# Patient Record
Sex: FEMALE | Race: WHITE | NOT HISPANIC OR LATINO | Employment: FULL TIME | URBAN - NONMETROPOLITAN AREA
[De-identification: names, ages, dates, MRNs, and addresses within clinical notes are randomized per-mention and may not be internally consistent; named-entity substitution may affect disease eponyms.]

---

## 2018-07-21 ENCOUNTER — APPOINTMENT (EMERGENCY)
Dept: RADIOLOGY | Facility: HOSPITAL | Age: 28
End: 2018-07-21
Payer: COMMERCIAL

## 2018-07-21 ENCOUNTER — HOSPITAL ENCOUNTER (EMERGENCY)
Facility: HOSPITAL | Age: 28
Discharge: HOME | End: 2018-07-21
Attending: EMERGENCY MEDICINE | Admitting: EMERGENCY MEDICINE
Payer: COMMERCIAL

## 2018-07-21 VITALS
SYSTOLIC BLOOD PRESSURE: 116 MMHG | BODY MASS INDEX: 17.89 KG/M2 | TEMPERATURE: 98.2 F | RESPIRATION RATE: 20 BRPM | HEIGHT: 66 IN | HEART RATE: 95 BPM | DIASTOLIC BLOOD PRESSURE: 65 MMHG | WEIGHT: 111.33 LBS | OXYGEN SATURATION: 100 %

## 2018-07-21 DIAGNOSIS — R00.2 PALPITATIONS: ICD-10-CM

## 2018-07-21 DIAGNOSIS — E87.6 HYPOKALEMIA: ICD-10-CM

## 2018-07-21 DIAGNOSIS — R07.9 CHEST PAIN: Primary | ICD-10-CM

## 2018-07-21 LAB
ALBUMIN SERPL BCP-MCNC: 4.7 G/DL (ref 3.5–5)
ALP SERPL-CCNC: 53 U/L (ref 46–116)
ALT SERPL W P-5'-P-CCNC: 36 U/L (ref 12–78)
ANION GAP SERPL CALCULATED.3IONS-SCNC: 15 MMOL/L (ref 4–13)
AST SERPL W P-5'-P-CCNC: 25 U/L (ref 5–45)
ATRIAL RATE: 91 BPM
BASOPHILS # BLD AUTO: 0.06 THOUSANDS/ΜL (ref 0–0.1)
BASOPHILS NFR BLD AUTO: 1 % (ref 0–1)
BILIRUB SERPL-MCNC: 0.6 MG/DL (ref 0.2–1)
BUN SERPL-MCNC: 15 MG/DL (ref 5–25)
CALCIUM SERPL-MCNC: 9.1 MG/DL (ref 8.3–10.1)
CHLORIDE SERPL-SCNC: 98 MMOL/L (ref 100–108)
CO2 SERPL-SCNC: 24 MMOL/L (ref 21–32)
CREAT SERPL-MCNC: 0.84 MG/DL (ref 0.6–1.3)
DEPRECATED D DIMER PPP: <270 NG/ML (FEU) (ref 0–424)
EOSINOPHIL # BLD AUTO: 0.07 THOUSAND/ΜL (ref 0–0.61)
EOSINOPHIL NFR BLD AUTO: 1 % (ref 0–6)
ERYTHROCYTE [DISTWIDTH] IN BLOOD BY AUTOMATED COUNT: 12.8 % (ref 11.6–15.1)
EXT PREG TEST URINE: NEGATIVE
GFR SERPL CREATININE-BSD FRML MDRD: 94 ML/MIN/1.73SQ M
GLUCOSE SERPL-MCNC: 184 MG/DL (ref 65–140)
HCT VFR BLD AUTO: 42.5 % (ref 34.8–46.1)
HGB BLD-MCNC: 14.4 G/DL (ref 11.5–15.4)
HOLD SPECIMEN: NORMAL
LYMPHOCYTES # BLD AUTO: 2.23 THOUSANDS/ΜL (ref 0.6–4.47)
LYMPHOCYTES NFR BLD AUTO: 38 % (ref 14–44)
MAGNESIUM SERPL-MCNC: 1.7 MG/DL (ref 1.6–2.6)
MCH RBC QN AUTO: 32 PG (ref 26.8–34.3)
MCHC RBC AUTO-ENTMCNC: 33.9 G/DL (ref 31.4–37.4)
MCV RBC AUTO: 94 FL (ref 82–98)
MONOCYTES # BLD AUTO: 0.45 THOUSAND/ΜL (ref 0.17–1.22)
MONOCYTES NFR BLD AUTO: 8 % (ref 4–12)
NEUTROPHILS # BLD AUTO: 3.07 THOUSANDS/ΜL (ref 1.85–7.62)
NEUTS SEG NFR BLD AUTO: 52 % (ref 43–75)
P AXIS: 80 DEGREES
PLATELET # BLD AUTO: 189 THOUSANDS/UL (ref 149–390)
PMV BLD AUTO: 10.6 FL (ref 8.9–12.7)
POTASSIUM SERPL-SCNC: 2.8 MMOL/L (ref 3.5–5.3)
PR INTERVAL: 140 MS
PROT SERPL-MCNC: 8.1 G/DL (ref 6.4–8.2)
QRS AXIS: 74 DEGREES
QRSD INTERVAL: 104 MS
QT INTERVAL: 348 MS
QTC INTERVAL: 428 MS
RBC # BLD AUTO: 4.5 MILLION/UL (ref 3.81–5.12)
SODIUM SERPL-SCNC: 137 MMOL/L (ref 136–145)
T WAVE AXIS: 72 DEGREES
TROPONIN I SERPL-MCNC: <0.02 NG/ML
VENTRICULAR RATE: 91 BPM
WBC # BLD AUTO: 5.88 THOUSAND/UL (ref 4.31–10.16)

## 2018-07-21 PROCEDURE — 93005 ELECTROCARDIOGRAM TRACING: CPT

## 2018-07-21 PROCEDURE — 96360 HYDRATION IV INFUSION INIT: CPT

## 2018-07-21 PROCEDURE — 99285 EMERGENCY DEPT VISIT HI MDM: CPT

## 2018-07-21 PROCEDURE — 85379 FIBRIN DEGRADATION QUANT: CPT | Performed by: EMERGENCY MEDICINE

## 2018-07-21 PROCEDURE — 85025 COMPLETE CBC W/AUTO DIFF WBC: CPT | Performed by: EMERGENCY MEDICINE

## 2018-07-21 PROCEDURE — 81025 URINE PREGNANCY TEST: CPT | Performed by: EMERGENCY MEDICINE

## 2018-07-21 PROCEDURE — 36415 COLL VENOUS BLD VENIPUNCTURE: CPT

## 2018-07-21 PROCEDURE — 83735 ASSAY OF MAGNESIUM: CPT | Performed by: EMERGENCY MEDICINE

## 2018-07-21 PROCEDURE — 71046 X-RAY EXAM CHEST 2 VIEWS: CPT

## 2018-07-21 PROCEDURE — 93010 ELECTROCARDIOGRAM REPORT: CPT | Performed by: INTERNAL MEDICINE

## 2018-07-21 PROCEDURE — 84484 ASSAY OF TROPONIN QUANT: CPT | Performed by: EMERGENCY MEDICINE

## 2018-07-21 PROCEDURE — 80053 COMPREHEN METABOLIC PANEL: CPT | Performed by: EMERGENCY MEDICINE

## 2018-07-21 RX ORDER — DIPHENOXYLATE HYDROCHLORIDE AND ATROPINE SULFATE 2.5; .025 MG/1; MG/1
1 TABLET ORAL DAILY
COMMUNITY

## 2018-07-21 RX ORDER — GLATIRAMER 40 MG/ML
40 INJECTION, SOLUTION SUBCUTANEOUS 3 TIMES WEEKLY
COMMUNITY

## 2018-07-21 RX ORDER — TURMERIC ROOT EXTRACT 500 MG
500 TABLET ORAL
COMMUNITY

## 2018-07-21 RX ORDER — POTASSIUM CHLORIDE 14.9 MG/ML
20 INJECTION INTRAVENOUS ONCE
Status: DISCONTINUED | OUTPATIENT
Start: 2018-07-21 | End: 2018-07-21

## 2018-07-21 RX ORDER — POTASSIUM CHLORIDE 20 MEQ/1
40 TABLET, EXTENDED RELEASE ORAL ONCE
Status: COMPLETED | OUTPATIENT
Start: 2018-07-21 | End: 2018-07-21

## 2018-07-21 RX ORDER — MELATONIN
2000 DAILY
COMMUNITY

## 2018-07-21 RX ORDER — POTASSIUM CHLORIDE 20 MEQ/1
40 TABLET, EXTENDED RELEASE ORAL ONCE
Status: DISCONTINUED | OUTPATIENT
Start: 2018-07-21 | End: 2018-07-21

## 2018-07-21 RX ADMIN — SODIUM CHLORIDE 1000 ML: 0.9 INJECTION, SOLUTION INTRAVENOUS at 16:41

## 2018-07-21 RX ADMIN — POTASSIUM CHLORIDE 40 MEQ: 1500 TABLET, EXTENDED RELEASE ORAL at 17:19

## 2018-07-21 NOTE — DISCHARGE INSTRUCTIONS
YOU NEED REPEAT LABS IN 2- 3 DAYS TO FOLLOW YOUR POTASSIUM    Chest Pain   WHAT YOU NEED TO KNOW:   Chest pain can be caused by a range of conditions, from not serious to life-threatening  Chest pain can be a symptom of a digestive problem, such as acid reflux or a stomach ulcer  An anxiety attack or a strong emotion, such as anger, can also cause chest pain  Infection, inflammation, or a fracture in the bones or cartilage in your chest can cause pain or discomfort  Sometimes chest pain or pressure is caused by poor blood flow to your heart (angina)  Chest pain may also be caused by life-threatening conditions such as a heart attack or blood clot in your lungs  DISCHARGE INSTRUCTIONS:   Call 911 if:   · You have any of the following signs of a heart attack:      ¨ Squeezing, pressure, or pain in your chest that lasts longer than 5 minutes or returns    ¨ Discomfort or pain in your back, neck, jaw, stomach, or arm     ¨ Trouble breathing    ¨ Nausea or vomiting    ¨ Lightheadedness or a sudden cold sweat, especially with chest pain or trouble breathing    Seek care immediately if:   · You have chest discomfort that gets worse, even with medicine  · You cough or vomit blood  · Your bowel movements are black or bloody  · You cannot stop vomiting, or it hurts to swallow  Contact your healthcare provider if:   · You have questions or concerns about your condition or care  Medicines:   · Medicines  may be given to treat the cause of your chest pain  Examples include pain medicine, anxiety medicine, or medicines to increase blood flow to your heart  · Do not take certain medicines without asking your healthcare provider first   These include NSAIDs, herbal or vitamin supplements, or hormones (estrogen or progestin)  · Take your medicine as directed  Contact your healthcare provider if you think your medicine is not helping or if you have side effects   Tell him or her if you are allergic to any medicine  Keep a list of the medicines, vitamins, and herbs you take  Include the amounts, and when and why you take them  Bring the list or the pill bottles to follow-up visits  Carry your medicine list with you in case of an emergency  Follow up with your healthcare provider within 72 hours, or as directed: You may need to return for more tests to find the cause of your chest pain  You may be referred to a specialist, such as a cardiologist or gastroenterologist  Write down your questions so you remember to ask them during your visits  Healthy living tips: The following are general healthy guidelines  If your chest pain is caused by a heart problem, your healthcare provider will give you specific guidelines to follow  · Do not smoke  Nicotine and other chemicals in cigarettes and cigars can cause lung and heart damage  Ask your healthcare provider for information if you currently smoke and need help to quit  E-cigarettes or smokeless tobacco still contain nicotine  Talk to your healthcare provider before you use these products  · Eat a variety of healthy, low-fat foods  Healthy foods include fruits, vegetables, whole-grain breads, low-fat dairy products, beans, lean meats, and fish  Ask for more information about a heart healthy diet  · Ask about activity  Your healthcare provider will tell you which activities to limit or avoid  Ask when you can drive, return to work, and have sex  Ask about the best exercise plan for you  · Maintain a healthy weight  Ask your healthcare provider how much you should weigh  Ask him or her to help you create a weight loss plan if you are overweight  © 2017 [address] [employer] Information is for End User's use only and may not be sold, redistributed or otherwise used for commercial purposes  All illustrations and images included in CareNotes® are the copyrighted property of [employer]  or [employer]    The above information is an  only  It is not intended as medical advice for individual conditions or treatments  Talk to your doctor, nurse or pharmacist before following any medical regimen to see if it is safe and effective for you  Heart Palpitations   WHAT YOU NEED TO KNOW:   Heart palpitations are feelings that your heart races, jumps, throbs, or flutters  You may feel extra beats, no beats for a short time, or skipped beats  You may have these feelings in your chest, throat, or neck  They may happen when you are sitting, standing, or lying  Heart palpitations may be frightening, but are usually not caused by a serious problem  DISCHARGE INSTRUCTIONS:   Call 911 or have someone else call for any of the following:   · You have any of the following signs of a heart attack:      ¨ Squeezing, pressure, or pain in your chest that lasts longer than 5 minutes or returns    ¨ Discomfort or pain in your back, neck, jaw, stomach, or arm     ¨ Trouble breathing    ¨ Nausea or vomiting    ¨ Lightheadedness or a sudden cold sweat, especially with chest pain or trouble breathing    · You have any of the following signs of a stroke:      ¨ Numbness or drooping on one side of your face     ¨ Weakness in an arm or leg    ¨ Confusion or difficulty speaking    ¨ Dizziness, a severe headache, or vision loss    · You faint or lose consciousness  Seek care immediately if:   · Your palpitations happen more often or last longer than usual      · You have palpitations and shortness of breath, nausea, sweating, or dizziness  Contact your healthcare provider if:   · You have questions or concerns about your condition or care  Follow up with your healthcare provider as directed: You may need to follow up with a cardiologist  Michael Never may need tests to check for heart problems that cause palpitations  Write down your questions so you remember to ask them during your visits     Keep a record:  Write down when your palpitations start and stop, what you were doing when they started, and your symptoms  Keep track of what you ate or drank within a few hours of your palpitations  Include anything that seemed to help your symptoms, such as lying down or holding your breath  This record will help you and your healthcare provider learn what triggers your palpitations  Bring this record with you to your follow up visits  Help prevent heart palpitations:   · Manage stress and anxiety  Find ways to relax such as listening to music, meditating, or doing yoga  Exercise can also help decrease stress and anxiety  Talk to someone you trust about your stress or anxiety  You can also talk to a therapist      · Get plenty of sleep every night  Ask your healthcare provider how much sleep you need each night  · Do not drink caffeine or alcohol  Caffeine and alcohol can make your palpitations worse  Caffeine is found in soda, coffee, tea, chocolate, and drinks that increase your energy  · Do not smoke  Nicotine and other chemicals in cigarettes and cigars may damage your heart and blood vessels  Ask your healthcare provider for information if you currently smoke and need help to quit  E-cigarettes or smokeless tobacco still contain nicotine  Talk to your healthcare provider before you use these products  · Do not use illegal drugs  Talk to your healthcare provider if you use illegal drugs and want help to quit  © 2017 [address] [employer] Information is for End User's use only and may not be sold, redistributed or otherwise used for commercial purposes  All illustrations and images included in CareNotes® are the copyrighted property of [employer]  or [employer]  The above information is an  only  It is not intended as medical advice for individual conditions or treatments  Talk to your doctor, nurse or pharmacist before following any medical regimen to see if it is safe and effective for you      Hypokalemia   WHAT YOU NEED TO KNOW:   Hypokalemia is a low level of potassium in your blood  Potassium helps control how your muscles, heart, and digestive system work  Hypokalemia occurs when your body loses too much potassium or does not absorb enough from food  DISCHARGE INSTRUCTIONS:   Seek care immediately if:   · You cannot move your arm or leg  · You have a fast or irregular heartbeat  · You are too tired or weak to stand up  Contact your healthcare provider if:   · You are vomiting, or you have diarrhea  · You have numbness or tingling in your arms or legs  · Your symptoms do not go away or they get worse  · You have questions or concerns about your condition or care  Medicines:   · Potassium  will be given to bring your potassium levels back to normal     · Take your medicine as directed  Contact your healthcare provider if you think your medicine is not helping or if you have side effects  Tell him of her if you are allergic to any medicine  Keep a list of the medicines, vitamins, and herbs you take  Include the amounts, and when and why you take them  Bring the list or the pill bottles to follow-up visits  Carry your medicine list with you in case of an emergency  Eat foods that are high in potassium:  Foods that are high in potassium include bananas, oranges, tomatoes, potatoes, and avocado  Velasquez beans, turkey, salmon, lean beef, yogurt, and milk are also high in potassium  Ask your healthcare provider or dietitian for more information about foods that are high in potassium  Follow up with your healthcare provider as directed:  Write down your questions so you remember to ask them during your visits  © 2017 [address] [employer] Information is for End User's use only and may not be sold, redistributed or otherwise used for commercial purposes  All illustrations and images included in CareNotes® are the copyrighted property of [employer]  or [employer]    The above information is an  only  It is not intended as medical advice for individual conditions or treatments  Talk to your doctor, nurse or pharmacist before following any medical regimen to see if it is safe and effective for you

## 2018-07-21 NOTE — ED PROVIDER NOTES
History  Chief Complaint   Patient presents with    Chest Pain     Pt states intermittent CP since thursday  While at [facility] this afternoon developed left sided chest tightness and palpitations  States mild SOB in car ride to ER but states she had herself "anxious"  Pain does not radiate and denies cardiac hx  Patient is a 30-year-old female with a history of MS (is on IM medication 3 times a week for the past 3-4 months) coming in today with intermittent chest pain and palpitations  Patient states that this all started approximately 2 days ago  There is no associated time of day or association with activity  She feels like her heart is fluttering  She is near syncopal events without true syncope  She describes as a pressure heaviness without any association of diaphoresis, nausea, vomiting  She has no diarrhea  She is on a ketogenic diet she states and was using potassium and salt supplements  She denies any hemoptysis, estrogen use, recent travel or recent surgeries  She has no family history of cardiac disease or sudden cardiac death          History provided by:  Patient   used: No    Palpitations   Palpitations quality:  Fast  Onset quality:  Sudden  Timing:  Intermittent  Progression:  Waxing and waning  Chronicity:  New  Context: not anxiety, not appetite suppressants, not blood loss, not bronchodilators, not caffeine, not dehydration, not hyperventilation, not illicit drugs, not nicotine and not stimulant use    Relieved by:  None tried  Worsened by:  Nothing  Associated symptoms: chest pain, chest pressure and near-syncope    Associated symptoms: no back pain, no diaphoresis, no hemoptysis, no leg pain, no lower extremity edema, no nausea, no orthopnea, no PND, no shortness of breath, no syncope, no vomiting and no weakness    Risk factors: no diabetes mellitus, no heart disease, no hx of atrial fibrillation, no hx of DVT, no hx of PE, no hx of thyroid disease, no hypercoagulable state, no hyperthyroidism, no OTC sinus medications and no stress        Prior to Admission Medications   Prescriptions Last Dose Informant Patient Reported? Taking? Glatiramer Acetate 40 MG/ML SOSY  Self Yes Yes   Sig: Inject 40 mg under the skin 3 (three) times a week   Turmeric 500 MG TABS  Self Yes Yes   Sig: Take 500 mg by mouth   cholecalciferol (VITAMIN D3) 1,000 units tablet  Self Yes Yes   Sig: Take 2,000 Units by mouth daily   multivitamin (THERAGRAN) TABS  Self Yes Yes   Sig: Take 1 tablet by mouth daily      Facility-Administered Medications: None       Past Medical History:   Diagnosis Date    MS (multiple sclerosis) ([facility] [address] )        History reviewed  No pertinent surgical history  History reviewed  No pertinent family history  I have reviewed and agree with the history as documented  Social History   Substance Use Topics    Smoking status: Never Smoker    Smokeless tobacco: Never Used    Alcohol use Yes        Review of Systems   Constitutional: Negative for diaphoresis and fever  HENT: Negative for ear pain and sore throat  Eyes: Negative for visual disturbance  Respiratory: Negative for hemoptysis, chest tightness, shortness of breath and wheezing  Cardiovascular: Positive for chest pain, palpitations and near-syncope  Negative for orthopnea, syncope and PND  Gastrointestinal: Negative for abdominal pain, nausea and vomiting  Genitourinary: Negative for difficulty urinating and dysuria  Musculoskeletal: Negative for back pain and neck pain  Skin: Negative for rash  Neurological: Negative for weakness  Psychiatric/Behavioral: Negative for confusion  All other systems reviewed and are negative  Physical Exam  Physical Exam   Constitutional: She is oriented to person, place, and time  She appears well-developed and well-nourished  No distress  HENT:   Head: Normocephalic and atraumatic     Mouth/Throat: Oropharynx is clear and moist    Eyes: Conjunctivae and EOM are normal  Pupils are equal, round, and reactive to light  Neck: Normal range of motion  Neck supple  Cardiovascular: Normal rate, regular rhythm, normal heart sounds and intact distal pulses  No murmur heard  Pulmonary/Chest: Effort normal and breath sounds normal  No stridor  No respiratory distress  Abdominal: Soft  Bowel sounds are normal  She exhibits no distension  There is no tenderness  Musculoskeletal: Normal range of motion  She exhibits no edema  Neurological: She is alert and oriented to person, place, and time  No cranial nerve deficit  Skin: Skin is warm  Capillary refill takes less than 2 seconds  She is not diaphoretic  Nursing note and vitals reviewed        Vital Signs  ED Triage Vitals   Temperature Pulse Respirations Blood Pressure SpO2   07/21/18 1705 07/21/18 1626 07/21/18 1626 07/21/18 1626 07/21/18 1626   98 2 °F (36 8 °C) 90 18 157/87 96 %      Temp Source Heart Rate Source Patient Position - Orthostatic VS BP Location FiO2 (%)   07/21/18 1705 07/21/18 1626 07/21/18 1626 07/21/18 1626 --   Temporal Monitor Lying Left arm       Pain Score       07/21/18 1626       1           Vitals:    07/21/18 1626 07/21/18 1645 07/21/18 1705 07/21/18 1730   BP: 157/87 133/74 138/75 116/65   Pulse: 90 101 82 95   Patient Position - Orthostatic VS: Lying Lying Lying Lying       Visual Acuity      ED Medications  Medications   sodium chloride 0 9 % bolus 1,000 mL (0 mL Intravenous Stopped 7/21/18 1741)   potassium chloride (K-DUR,KLOR-CON) CR tablet 40 mEq (40 mEq Oral Given 7/21/18 1719)       Diagnostic Studies  Results Reviewed     Procedure Component Value Units Date/Time    Comprehensive metabolic panel [[id number]]  (Abnormal) Collected:  07/21/18 1634    Lab Status:  Final result Specimen:  Blood from Arm, Right Updated:  07/21/18 1710     Sodium 137 mmol/L      Potassium 2 8 (L) mmol/L      Chloride 98 (L) mmol/L      CO2 24 mmol/L      Anion Gap 15 (H) mmol/L BUN 15 mg/dL      Creatinine 0 84 mg/dL      Glucose 184 (H) mg/dL      Calcium 9 1 mg/dL      AST 25 U/L      ALT 36 U/L      Alkaline Phosphatase 53 U/L      Total Protein 8 1 g/dL      Albumin 4 7 g/dL      Total Bilirubin 0 60 mg/dL      eGFR 94 ml/min/1 73sq m     Narrative:         National Kidney Disease Education Program recommendations are as follows:  GFR calculation is accurate only with a steady state creatinine  Chronic Kidney disease less than 60 ml/min/1 73 sq  meters  Kidney failure less than 15 ml/min/1 73 sq  meters  Magnesium [[id number]]  (Normal) Collected:  07/21/18 1634    Lab Status:  Final result Specimen:  Blood from Arm, Right Updated:  07/21/18 1710     Magnesium 1 7 mg/dL     Troponin I [[id number]]  (Normal) Collected:  07/21/18 1645    Lab Status:  Final result Specimen:  Blood Updated:  07/21/18 1708     Troponin I <0 02 ng/mL     [place] draw [[id number]] Collected:  07/21/18 1634    Lab Status: In process Specimen:  Blood from Arm, Right Updated:  07/21/18 1703    Narrative: The following orders were created for panel order [place] draw  Procedure                               Abnormality         Status                     ---------                               -----------         ------                     Yobany Nohelia Top on GGWZ[[id number]]                            Final result               Gold top on ZCMD[[id number]]                                  In process                 Green / Yellow tube on FDEF[[id number]]                       In process                 Green / Black tube on CTAP[[id number]]                        In process                 Lavender Top 3 ml on KHRV[[id number]]                         In process                   Please view results for these tests on the individual orders      D-Dimer [[id number]]  (Normal) Collected:  07/21/18 1634    Lab Status:  Final result Specimen:  Blood from Arm, Right Updated:  07/21/18 1703     D-Dimer, Quant <270 ng/ml (FEU) CBC and differential [[id number]]  (Normal) Collected:  07/21/18 1634    Lab Status:  Final result Specimen:  Blood from Arm, Right Updated:  07/21/18 1645     WBC 5 88 Thousand/uL      RBC 4 50 Million/uL      Hemoglobin 14 4 g/dL      Hematocrit 42 5 %      MCV 94 fL      MCH 32 0 pg      MCHC 33 9 g/dL      RDW 12 8 %      MPV 10 6 fL      Platelets 404 Thousands/uL      Neutrophils Relative 52 %      Lymphocytes Relative 38 %      Monocytes Relative 8 %      Eosinophils Relative 1 %      Basophils Relative 1 %      Neutrophils Absolute 3 07 Thousands/µL      Lymphocytes Absolute 2 23 Thousands/µL      Monocytes Absolute 0 45 Thousand/µL      Eosinophils Absolute 0 07 Thousand/µL      Basophils Absolute 0 06 Thousands/µL     POCT pregnancy, urine [[id number]]  (Normal) Resulted:  07/21/18 1639    Lab Status:  Final result Updated:  07/21/18 1639     EXT PREG TEST UR (Ref: Negative) negative                 XR chest 2 views    (Results Pending)              Procedures  Procedures       Phone Contacts  ED Phone Contact    ED Course         HEART Risk Score      Most Recent Value   History  0 Filed at: 07/21/2018 1708   ECG  1 Filed at: 07/21/2018 1708   Age  0 Filed at: 07/21/2018 1708   Risk Factors  0 Filed at: 07/21/2018 1708   Troponin  0 Filed at: 07/21/2018 1708   Heart Score Risk Calculator   History  0 Filed at: 07/21/2018 1708   ECG  1 Filed at: 07/21/2018 1708   Age  0 Filed at: 07/21/2018 1708   Risk Factors  0 Filed at: 07/21/2018 1708   Troponin  0 Filed at: 07/21/2018 1708   HEART Score  1 Filed at: 07/21/2018 1708   HEART Score  1 Filed at: 07/21/2018 1708            PERC Rule for PE      Most Recent Value   PERC Rule for PE   Age >=50  0 Filed at: 07/21/2018 1637   HR >=100  0 Filed at: 07/21/2018 1637   O2 Sat on room air < 95%  0 Filed at: 07/21/2018 1637   History of PE or DVT  0 Filed at: 07/21/2018 1637   Recent trauma or surgery  0 Filed at: 07/21/2018 1637   Hemoptysis  0 Filed at: 07/21/2018 1637   Exogenous estrogen  0 Filed at: 07/21/2018 1637   Unilateral leg swelling  0 Filed at: 07/21/2018 1637   PERC Rule for PE Results  0 Filed at: 07/21/2018 1637                Wells' Criteria for PE      Most Recent Value   Wells' Criteria for PE   Clinical signs and symptoms of DVT  0 Filed at: 07/21/2018 1637   PE is primary diagnosis or equally likely  0 Filed at: 07/21/2018 1637   HR >100  0 Filed at: 07/21/2018 1637   Immobilization at least 3 days or Surgery in the previous 4 weeks  0 Filed at: 07/21/2018 1637   Previous, objectively diagnosed PE or DVT  0 Filed at: 07/21/2018 1637   Hemoptysis  0 Filed at: 07/21/2018 1637   Malignancy with treatment within 6 months or palliative  0 Filed at: 07/21/2018 1637   Wells' Criteria Total  0 Filed at: 07/21/2018 1637            MDM  Number of Diagnoses or Management Options  Chest pain:   Hypokalemia:   Palpitations:   Diagnosis management comments: EKG INTERPRETATION AT 4:24 P M   RHYTHM:  NORMAL SINUS RHYTHM AT 90 BEATS PER MINUTE  AXIS:  NORMAL AXIS  INTERVALS:  IA INTERVAL MEASURED  MILLISECONDS  QRS COMPLEX:  QRS MEASURED  MILLISECONDS  ST SEGMENT:  NONSPECIFIC ST SEGMENT CHANGES  ARTIFACT PRESENT  THERE IS NOTED MILD ST DEPRESSION IN V5 AND V6   HOWEVER NO RECIPROCAL CHANGES  QT INTERVAL:  QTC MEASURED  MILLISECONDS  COMPARED WITH PRIOR NO OLD TO COMPARE  Interpretation by Kristy Dc DO    5:09 PM  Patient's 1st troponin as well as D-dimer negative  Given patient's palpitations as well as near syncopal events and on medications for MS we did discuss testing for PE      5:33 PM  Patient updated on labs and replaced potassium orally  Discussed with patient regarding her Keto diet and to add in low carb 1-2 times a day  Will not give prescription for potassium  She lives outside of [place] and does have a family doctor  Discussed with her the need to call her family doctor for follow-up labs in several days    She is agreeable for such plan  Patient remains alert oriented x3, cranial nerves 2-12 grossly intact with no focal neuro deficits  Patient regular rate and rhythm in no respiratory distress  Portions of the record may have been created with voice recognition software  Occasional wrong word or "sound a like" substitutions may have occurred due to the inherent limitations of voice recognition software  Read the chart carefully and recognize, using context, where substitutions have occurred  Amount and/or Complexity of Data Reviewed  Clinical lab tests: ordered and reviewed  Tests in the radiology section of CPT®: ordered and reviewed  Tests in the medicine section of CPT®: ordered and reviewed  Independent visualization of images, tracings, or specimens: yes (Chest x-ray on my read reveals no cardiomegaly, pneumothoraces, focal consolidation  No free air )      CritCare Time    Disposition  Final diagnoses:   Chest pain   Palpitations   Hypokalemia     Time reflects when diagnosis was documented in both MDM as applicable and the Disposition within this note     Time User Action Codes Description Comment    7/21/2018  5:14 PM Graciela Rey Add [R07 9] Chest pain     7/21/2018  5:14 PM Graciela Rey Add [R00 2] Palpitations     7/21/2018  5:14 PM Graciela Rey Add [E87 6] Hypokalemia       ED Disposition     ED Disposition Condition Comment    Discharge  Freya Cluster discharge to home/self care      Condition at discharge: Stable        Follow-up Information     Follow up With Specialties Details Why Contact Info    Lisa Lyman MD [facility] Schedule an appointment as soon as possible for a visit in 2 days  [address]  [address] [facility] [address]            Discharge Medication List as of 7/21/2018  5:26 PM      CONTINUE these medications which have NOT CHANGED    Details   cholecalciferol (VITAMIN D3) 1,000 units tablet Take 2,000 Units by mouth daily, Historical Med Glatiramer Acetate 40 MG/ML SOSY Inject 40 mg under the skin 3 (three) times a week, Historical Med      multivitamin (THERAGRAN) TABS Take 1 tablet by mouth daily, Historical Med      Turmeric 500 MG TABS Take 500 mg by mouth, Historical Med           No discharge procedures on file      ED Provider  Electronically Signed by           Arleen Ramirez DO  07/21/18 9945